# Patient Record
Sex: MALE | Race: WHITE | NOT HISPANIC OR LATINO | Employment: OTHER | URBAN - METROPOLITAN AREA
[De-identification: names, ages, dates, MRNs, and addresses within clinical notes are randomized per-mention and may not be internally consistent; named-entity substitution may affect disease eponyms.]

---

## 2019-11-21 ENCOUNTER — TRANSCRIBE ORDERS (OUTPATIENT)
Dept: ADMINISTRATIVE | Facility: HOSPITAL | Age: 64
End: 2019-11-21

## 2019-11-21 ENCOUNTER — APPOINTMENT (OUTPATIENT)
Dept: RADIOLOGY | Facility: CLINIC | Age: 64
End: 2019-11-21
Payer: COMMERCIAL

## 2019-11-21 DIAGNOSIS — M25.562 LEFT KNEE PAIN, UNSPECIFIED CHRONICITY: ICD-10-CM

## 2019-11-21 DIAGNOSIS — M25.562 LEFT KNEE PAIN, UNSPECIFIED CHRONICITY: Primary | ICD-10-CM

## 2019-11-21 PROCEDURE — 73564 X-RAY EXAM KNEE 4 OR MORE: CPT

## 2022-09-14 ENCOUNTER — TELEPHONE (OUTPATIENT)
Dept: PAIN MEDICINE | Facility: CLINIC | Age: 67
End: 2022-09-14

## 2022-09-14 NOTE — TELEPHONE ENCOUNTER
Pt is schedule for a consult with Dr Wayne Henry on 9/20/22 but [employer] PPO is coming back as E-jected     Does Pt have new insurance? Name? Member ID#?

## 2022-09-14 NOTE — TELEPHONE ENCOUNTER
pts wife called and provided insurance update :    Primary is Medicare a/b    Member [id number]       [employer]   Member ID- [id number]

## 2022-09-20 ENCOUNTER — APPOINTMENT (OUTPATIENT)
Dept: RADIOLOGY | Facility: CLINIC | Age: 67
End: 2022-09-20
Payer: MEDICARE

## 2022-09-20 ENCOUNTER — CONSULT (OUTPATIENT)
Dept: PAIN MEDICINE | Facility: CLINIC | Age: 67
End: 2022-09-20
Payer: MEDICARE

## 2022-09-20 VITALS
SYSTOLIC BLOOD PRESSURE: 118 MMHG | WEIGHT: 240 LBS | DIASTOLIC BLOOD PRESSURE: 74 MMHG | HEART RATE: 77 BPM | HEIGHT: 78 IN | TEMPERATURE: 98.1 F | BODY MASS INDEX: 27.77 KG/M2

## 2022-09-20 DIAGNOSIS — M54.9 MID BACK PAIN: ICD-10-CM

## 2022-09-20 DIAGNOSIS — R29.898 WEAKNESS OF BOTH LOWER EXTREMITIES: ICD-10-CM

## 2022-09-20 DIAGNOSIS — M35.3 POLYMYALGIA RHEUMATICA (HCC): ICD-10-CM

## 2022-09-20 DIAGNOSIS — M54.9 MID BACK PAIN: Primary | ICD-10-CM

## 2022-09-20 PROBLEM — G72.9 MYOPATHY: Status: ACTIVE | Noted: 2021-10-15

## 2022-09-20 PROCEDURE — 72100 X-RAY EXAM L-S SPINE 2/3 VWS: CPT

## 2022-09-20 PROCEDURE — 99204 OFFICE O/P NEW MOD 45 MIN: CPT | Performed by: ANESTHESIOLOGY

## 2022-09-20 PROCEDURE — 71100 X-RAY EXAM RIBS UNI 2 VIEWS: CPT

## 2022-09-20 PROCEDURE — 72070 X-RAY EXAM THORAC SPINE 2VWS: CPT

## 2022-09-20 RX ORDER — GLIMEPIRIDE 2 MG/1
TABLET ORAL
COMMUNITY
Start: 2022-07-11

## 2022-09-20 RX ORDER — METOPROLOL SUCCINATE 50 MG/1
50 TABLET, EXTENDED RELEASE ORAL DAILY
COMMUNITY
Start: 2022-09-01

## 2022-09-20 RX ORDER — LEVOTHYROXINE SODIUM 125 UG/1
TABLET ORAL
COMMUNITY
Start: 2022-08-08

## 2022-09-20 RX ORDER — AMLODIPINE BESYLATE 10 MG/1
10 TABLET ORAL DAILY
COMMUNITY
Start: 2022-08-08

## 2022-09-20 RX ORDER — PREDNISONE 1 MG/1
TABLET ORAL DAILY
COMMUNITY

## 2022-09-20 RX ORDER — SIMVASTATIN 40 MG
40 TABLET ORAL EVERY EVENING
COMMUNITY
Start: 2022-08-02

## 2022-09-20 NOTE — PROGRESS NOTES
Assessment  1  Mid back pain    2  Weakness of both lower extremities    3  Polymyalgia rheumatica ([address] )        Plan    Pleasant 44-year-old gentleman two month history of right-sided back spasms however patient has a history of weakness lower limbs recent MRI from 2021 revealed no significant stenosis EMG also apparently reveals weakness  At this point in time as symptoms persist I believe it is reasonable obtain x-rays of the thoracolumbar spine trial any compression fracture as well as rib x-rays to make sure there is no strain  Secondary to the weakness of lower limbs which is essentially unexplained as well as his current symptoms will obtain MRI of the thoracic spine to rule any significant pathology that may be contributing to his symptoms  May consider future gabapentin versus injectional therapy but will re-evaluate  My impressions and treatment recommendations were discussed in detail with the patient who verbalized understanding and had no further questions  Discharge instructions were provided  I personally saw and examined the patient and I agree with the above discussed plan of care  This note is created using dictation transcription  It may contain typographical errors, grammatical errors, improperly dictated words, background noise and other errors  Orders Placed This Encounter   Procedures    XR ribs 2 vw right     Standing Status:   Future     Standing Expiration Date:   9/20/2026     Scheduling Instructions:      Bring along any outside films relating to this procedure   X-ray thoracic spine 2 views     Standing Status:   Future     Standing Expiration Date:   9/20/2026     Scheduling Instructions:      Bring along any outside films relating to this procedure   X-ray lumbar spine 2 or 3 views     Standing Status:   Future     Standing Expiration Date:   9/20/2026     Scheduling Instructions:      Bring along any outside films relating to this procedure   MRI thoracic spine without contrast     Standing Status:   Future     Standing Expiration Date:   9/20/2026     Scheduling Instructions: There is no preparation for this test  Please leave your jewelry and valuables at home, wedding rings are the exception  All patients will be required to change into a hospital gown and pants  Street clothes are not permitted in the MRI  Magnetic nail polish must be removed prior to arrival for your test  Please bring your insurance cards, a form of photo ID and a list of your medications with you  Arrive 15 minutes prior to your appointment time in order to register  Please bring any prior CT or MRI studies of this area that were not performed at a [facility]  To schedule this appointment, please contact Central Scheduling at [phone]  Prior to your appointment, please make sure you complete the MRI Screening Form when you e-Check in for your appointment  This will be available starting 7 days before your appointment in [address]  You may receive an e-mail with an activation code if you do not have a [employer] account  If you do not have access to a device, we will complete your screening at your appointment  Order Specific Question:   What is the patient's sedation requirement? Answer:   No Sedation     Order Specific Question:   Release to patient through [employer]     Answer:   Immediate     Order Specific Question:   Is order priority selected as STAT?      Answer:   No     Order Specific Question:   Reason for Exam (FREE TEXT)     Answer:   lower extremity weakness     New Medications Ordered This Visit   Medications    metFORMIN (GLUCOPHAGE) 1000 MG tablet     Sig: TAKE AS DIRECTED 1 TABLET BY MOUTH TWICE DAILY FOR 90 DAYS    metoprolol succinate (TOPROL-XL) 50 mg 24 hr tablet     Sig: Take 50 mg by mouth daily    amLODIPine (NORVASC) 10 mg tablet     Sig: Take 10 mg by mouth daily    simvastatin (ZOCOR) 40 mg tablet Sig: Take 40 mg by mouth every evening    glimepiride (AMARYL) 2 mg tablet     Sig: TAKE 1/2 (ONE-HALF) TABLET BY MOUTH ONCE DAILY WITH BREAKFAST OR WITH FIRST MAIN MEAL OF THE DAY FOR ONE WEEK, THEN INCREASE TO 1 TABLET THEREAFTER WITH BREAKFAST ONCE DAILY    Euthyrox 125 MCG tablet     Sig: TAKE 1 TABLET BY MOUTH ONCE DAILY IN THE MORNING ON AN EMPTY STOMACH    predniSONE 5 mg tablet     Sig: Take by mouth daily     Referred By: Self  History of Present Illness    Sue Burgess Ny is a 74 y o  male with two month history of right-sided mid back pain  He is unaware of any clear precipitating event denies any trauma or injury  Also has a history of lower extremity weakness diagnosed with polymyalgia rheumatica and recently on oral steroids which is not helped with the right-sided back pain  Ultrasound reveals lipoma however he is concerning is his pain is almost significant interfering with daily living activities  It is moderate rates as 6/10 on the visual analog scale constant worse in the morning describes sharp  Patient also history of diabetes mellitus  Exercise and coughing aggravates his symptoms  He is a history of prior lumbar spine surgery  Nerve blocks physical therapy exercise have provided no relief if his current symptoms  I have personally reviewed and/or updated the patient's past medical history, past surgical history, family history, social history, current medications, allergies, and vital signs today  Review of Systems   Constitutional: Negative for fever and unexpected weight change  HENT: Negative for trouble swallowing  Eyes: Negative for visual disturbance  Respiratory: Negative for shortness of breath and wheezing  Cardiovascular: Negative for chest pain and palpitations  Gastrointestinal: Negative for constipation, diarrhea, nausea and vomiting  Endocrine: Negative for cold intolerance, heat intolerance and polydipsia     Genitourinary: Negative for difficulty urinating and frequency  Musculoskeletal: Negative for arthralgias, gait problem, joint swelling and myalgias  Skin: Negative for rash  Neurological: Negative for dizziness, seizures, syncope, weakness and headaches  Hematological: Does not bruise/bleed easily  Psychiatric/Behavioral: Negative for dysphoric mood  All other systems reviewed and are negative  Patient Active Problem List   Diagnosis    Polymyalgia rheumatica ([facility] )    Myopathy       Past Medical History:   Diagnosis Date    Diabetes mellitus ([facility] )     Thyroid disease        Past Surgical History:   Procedure Laterality Date    BACK SURGERY      HERNIA REPAIR      KNEE SURGERY Left     SHOULDER SURGERY Bilateral        History reviewed  No pertinent family history  Social History     Occupational History    Not on file   Tobacco Use    Smoking status: Never Smoker    Smokeless tobacco: Never Used   Vaping Use    Vaping Use: Every day    Substances: THC, CBD   Substance and Sexual Activity    Alcohol use: Yes    Drug use: Yes     Types: Marijuana    Sexual activity: Not Currently       Current Outpatient Medications on File Prior to Visit   Medication Sig    amLODIPine (NORVASC) 10 mg tablet Take 10 mg by mouth daily    Euthyrox 125 MCG tablet TAKE 1 TABLET BY MOUTH ONCE DAILY IN THE MORNING ON AN EMPTY STOMACH    glimepiride (AMARYL) 2 mg tablet TAKE 1/2 (ONE-HALF) TABLET BY MOUTH ONCE DAILY WITH BREAKFAST OR WITH FIRST MAIN MEAL OF THE DAY FOR ONE WEEK, THEN INCREASE TO 1 TABLET THEREAFTER WITH BREAKFAST ONCE DAILY    metFORMIN (GLUCOPHAGE) 1000 MG tablet TAKE AS DIRECTED 1 TABLET BY MOUTH TWICE DAILY FOR 90 DAYS    metoprolol succinate (TOPROL-XL) 50 mg 24 hr tablet Take 50 mg by mouth daily    predniSONE 5 mg tablet Take by mouth daily    simvastatin (ZOCOR) 40 mg tablet Take 40 mg by mouth every evening     No current facility-administered medications on file prior to visit         No Known Allergies    Physical Exam    /74 (BP Location: Left arm, Patient Position: Sitting, Cuff Size: Standard)   Pulse 77   Temp 98 1 °F (36 7 °C)   Ht 6' 6" (1 981 m)   Wt 109 kg (240 lb)   BMI 27 73 kg/m²     Constitutional: normal, well developed, well nourished, alert, in no distress and non-toxic and no overt pain behavior  Eyes: anicteric  HEENT: grossly intact  Neck: supple, symmetric, trachea midline and no masses   Pulmonary:even and unlabored  Cardiovascular:No edema or pitting edema present  Skin:Normal without rashes or lesions and well hydrated  Psychiatric:Mood and affect appropriate  Neurologic:Cranial Nerves II-XII grossly intact  Musculoskeletal:normal, no focal deficit appreciated lower limbs deep tendon reflexes are symmetrical 2+ bilateral upper lower limbs, no sensory deficits appreciated, negative bilateral straight leg raising, there is on the right rib region palpable mass  No tenderness on the thoracolumbar spinous process    Imaging    US Extremity nonvascular ltd @ 8-31-22  IMPRESSION:  1  Nonspecific solid isoechoic 6 1 cm subcutaneous mass corresponding with lump, likely lipoma  Clinical follow-up recommended  Further imaging can be obtained if indicated  MRI Lumbar Spine @ [facility] 8-24-21  IMPRESSION:   1  No focal disc herniation  2  No canal stenosis  No significant foraminal stenosis  3  Postop findings L5-S1  I have personally reviewed pertinent films in PACS and my interpretation is Lumbar spondylosis

## 2022-10-20 ENCOUNTER — OFFICE VISIT (OUTPATIENT)
Dept: PAIN MEDICINE | Facility: CLINIC | Age: 67
End: 2022-10-20
Payer: MEDICARE

## 2022-10-20 VITALS
WEIGHT: 231 LBS | BODY MASS INDEX: 26.73 KG/M2 | DIASTOLIC BLOOD PRESSURE: 70 MMHG | TEMPERATURE: 98.2 F | SYSTOLIC BLOOD PRESSURE: 110 MMHG | HEIGHT: 78 IN | HEART RATE: 73 BPM

## 2022-10-20 DIAGNOSIS — M54.9 MID BACK PAIN: ICD-10-CM

## 2022-10-20 DIAGNOSIS — G72.9 MYOPATHY: ICD-10-CM

## 2022-10-20 DIAGNOSIS — M47.814 THORACIC SPONDYLOSIS: ICD-10-CM

## 2022-10-20 DIAGNOSIS — M35.3 POLYMYALGIA RHEUMATICA (HCC): ICD-10-CM

## 2022-10-20 DIAGNOSIS — M51.24 THORACIC DISC HERNIATION: ICD-10-CM

## 2022-10-20 DIAGNOSIS — G89.4 CHRONIC PAIN SYNDROME: Primary | ICD-10-CM

## 2022-10-20 PROCEDURE — 99214 OFFICE O/P EST MOD 30 MIN: CPT | Performed by: NURSE PRACTITIONER

## 2022-10-20 RX ORDER — MELATONIN
1000 DAILY
COMMUNITY

## 2022-10-20 NOTE — PROGRESS NOTES
Assessment:  1  Chronic pain syndrome    2  Mid back pain    3  Thoracic spondylosis    4  Thoracic disc herniation    5  Polymyalgia rheumatica ([address] )    6  Myopathy        Plan:  The patient is a 74 y o  male last seen on 9-20-22 who presents for a follow up office visit in regards to secondary to midback pain, thoracic disc herniation, and polymyalgia rheumatica  Patient presents today with right-sided mid back pain  The pain has improved since the last office visit, and he feels it is now stable  MRI of the thoracic spine was reviewed with the patient which shows spondylosis as well as a right paracentral disc herniation at T8-T9  Since the pain is tolerable, I would not recommend moving forward with an epidural steroid injection  However if his pain symptoms would worsen, I instructed him to contact the office, an injection can be performed at that time        The patient will follow-up as needed for reevaluation  The patient was advised to contact the office should their symptoms worsen in the interim  The patient was agreeable and verbalized an understanding  History of Present Illness: The patient is a 74 y o  male last seen on 9-20-22 who presents for a follow up office visit in regards to secondary to midback pain, thoracic disc herniation, and polymyalgia rheumatica  His last office visit was September 20, 2022 which was his initial consultation, where he was ordered an MRI of the thoracic spine  Patient presents today with ongoing mid back pain  The pain is located primarily on the right side of the midback  It is intermittent and described as sharp  He feels the pain has improved since the last office visit, and the pain is tolerable right now  He did however, have a recent flare up while on vacation, where he was bending to walk into a lighthouse  The increased pain has subsided  He is rating his pain a 3/10 on numeric rating scale        The patient had underwent an MRI of the thoracic spine since the last office visit  It showed mild-to-moderate spondylosis at T8-T9 along with a moderate right-sided disc herniation at this level  He continues to be on prednisone but it was recently increased to 5 mg daily  This is prescribed by rheumatology    He had underwent EMG bilateral lower extremity in 2021 which showed possibility of myositis and a chronic left peroneal sensory motor neuropathy  I have personally reviewed and/or updated the patient's past medical history, past surgical history, family history, social history, current medications, allergies, and vital signs today  Review of Systems:    Review of Systems   Respiratory: Negative for shortness of breath  Cardiovascular: Negative for chest pain  Gastrointestinal: Negative for constipation, diarrhea, nausea and vomiting  Musculoskeletal: Negative for arthralgias, gait problem, joint swelling and myalgias  Skin: Negative for rash  Neurological: Negative for dizziness, seizures and weakness (muscle)  All other systems reviewed and are negative  Past Medical History:   Diagnosis Date   • Diabetes mellitus ([facility] )    • Thyroid disease        Past Surgical History:   Procedure Laterality Date   • BACK SURGERY     • HERNIA REPAIR     • KNEE SURGERY Left    • SHOULDER SURGERY Bilateral        No family history on file  Social History     Occupational History   • Not on file   Tobacco Use   • Smoking status: Never Smoker   • Smokeless tobacco: Never Used   Vaping Use   • Vaping Use: Every day   • Substances: THC, CBD   Substance and Sexual Activity   • Alcohol use:  Yes   • Drug use: Yes     Types: Marijuana   • Sexual activity: Not Currently         Current Outpatient Medications:   •  amLODIPine (NORVASC) 10 mg tablet, Take 10 mg by mouth daily, Disp: , Rfl:   •  Euthyrox 125 MCG tablet, TAKE 1 TABLET BY MOUTH ONCE DAILY IN THE MORNING ON AN EMPTY STOMACH, Disp: , Rfl:   •  glimepiride (AMARYL) 2 mg tablet, TAKE 1/2 (ONE-HALF) TABLET BY MOUTH ONCE DAILY WITH BREAKFAST OR WITH FIRST MAIN MEAL OF THE DAY FOR ONE WEEK, THEN INCREASE TO 1 TABLET THEREAFTER WITH BREAKFAST ONCE DAILY, Disp: , Rfl:   •  metFORMIN (GLUCOPHAGE) 1000 MG tablet, TAKE AS DIRECTED 1 TABLET BY MOUTH TWICE DAILY FOR 90 DAYS, Disp: , Rfl:   •  metoprolol succinate (TOPROL-XL) 50 mg 24 hr tablet, Take 50 mg by mouth daily, Disp: , Rfl:   •  predniSONE 5 mg tablet, Take by mouth daily, Disp: , Rfl:   •  simvastatin (ZOCOR) 40 mg tablet, Take 40 mg by mouth every evening, Disp: , Rfl:     No Known Allergies    Physical Exam:    There were no vitals taken for this visit  Constitutional:normal, well developed, well nourished, alert, in no distress and non-toxic and no overt pain behavior  Eyes:anicteric  HEENT:grossly intact  Neck:supple, symmetric, trachea midline and no masses   Pulmonary:even and unlabored  Cardiovascular:No edema or pitting edema present  Skin:Normal without rashes or lesions and well hydrated  Psychiatric:Mood and affect appropriate  Neurologic:Cranial Nerves II-XII grossly intact  Musculoskeletal:normal     Tenderness thoracic right paraspinal musculature       Imaging    MRI thoracic spine-10/10/2022 [facility]    No fracture or malalignment  Vertebral body heights are maintained  No pathological marrow signal       Mild-to-moderate anterior endplate spondylosis at mid to lower thoracic levels  There is disc narrowing at T8-T9  Small-to-moderate right paracentral herniation at T8-T9  No canal or foraminal stenosis  Thoracic cord is unremarkable and normal contour and caliber and signal   No cord compression  Impression:    Mild degenerative disc findings  Small-to-moderate right paracentral herniation at T8-T9 without signal mass effect  No canal or foraminal stenosis  No orders to display         No orders of the defined types were placed in this encounter

## 2022-10-21 ENCOUNTER — TELEPHONE (OUTPATIENT)
Dept: PAIN MEDICINE | Facility: CLINIC | Age: 67
End: 2022-10-21

## 2022-10-21 NOTE — TELEPHONE ENCOUNTER
Caller: Enoc     Doctor: Jeremías Negrete     Reason for call: patient is interested in moving forward and would like to schedule procedure       Call back#: [phone]

## 2022-10-24 DIAGNOSIS — M54.14 THORACIC RADICULOPATHY: ICD-10-CM

## 2022-10-24 DIAGNOSIS — M51.24 THORACIC DISC HERNIATION: Primary | ICD-10-CM

## 2022-10-28 ENCOUNTER — HOSPITAL ENCOUNTER (OUTPATIENT)
Dept: RADIOLOGY | Facility: CLINIC | Age: 67
Discharge: HOME | End: 2022-10-28
Payer: MEDICARE

## 2022-10-28 VITALS
SYSTOLIC BLOOD PRESSURE: 131 MMHG | DIASTOLIC BLOOD PRESSURE: 79 MMHG | RESPIRATION RATE: 20 BRPM | TEMPERATURE: 97.9 F | HEART RATE: 72 BPM | OXYGEN SATURATION: 96 %

## 2022-10-28 DIAGNOSIS — M54.14 THORACIC RADICULOPATHY: ICD-10-CM

## 2022-10-28 DIAGNOSIS — M51.24 THORACIC DISC HERNIATION: ICD-10-CM

## 2022-10-28 PROCEDURE — 62321 NJX INTERLAMINAR CRV/THRC: CPT | Performed by: ANESTHESIOLOGY

## 2022-10-28 RX ORDER — PAPAVERINE HCL 150 MG
10 CAPSULE, EXTENDED RELEASE ORAL ONCE
Status: COMPLETED | OUTPATIENT
Start: 2022-10-28 | End: 2022-10-28

## 2022-10-28 RX ADMIN — DEXAMETHASONE SODIUM PHOSPHATE 10 MG: 10 INJECTION, SOLUTION INTRAMUSCULAR; INTRAVENOUS at 10:48

## 2022-10-28 RX ADMIN — IOHEXOL 1 ML: 300 INJECTION, SOLUTION INTRAVENOUS at 10:48

## 2022-10-28 NOTE — H&P
History of Present Illness: The patient is a 74 y o  male who presents with complaints of thoracic pain  Past Medical History:   Diagnosis Date   • Diabetes mellitus ([facility] )    • Thyroid disease        Past Surgical History:   Procedure Laterality Date   • BACK SURGERY     • HERNIA REPAIR     • KNEE SURGERY Left    • SHOULDER SURGERY Bilateral          Current Outpatient Medications:   •  amLODIPine (NORVASC) 10 mg tablet, Take 10 mg by mouth daily, Disp: , Rfl:   •  cholecalciferol (VITAMIN D3) 1,000 units tablet, Take 1,000 Units by mouth daily, Disp: , Rfl:   •  Euthyrox 125 MCG tablet, TAKE 1 TABLET BY MOUTH ONCE DAILY IN THE MORNING ON AN EMPTY STOMACH, Disp: , Rfl:   •  glimepiride (AMARYL) 2 mg tablet, TAKE 1/2 (ONE-HALF) TABLET BY MOUTH ONCE DAILY WITH BREAKFAST OR WITH FIRST MAIN MEAL OF THE DAY FOR ONE WEEK, THEN INCREASE TO 1 TABLET THEREAFTER WITH BREAKFAST ONCE DAILY, Disp: , Rfl:   •  metFORMIN (GLUCOPHAGE) 1000 MG tablet, TAKE AS DIRECTED 1 TABLET BY MOUTH TWICE DAILY FOR 90 DAYS, Disp: , Rfl:   •  metoprolol succinate (TOPROL-XL) 50 mg 24 hr tablet, Take 50 mg by mouth daily, Disp: , Rfl:   •  predniSONE 5 mg tablet, Take by mouth daily, Disp: , Rfl:   •  simvastatin (ZOCOR) 40 mg tablet, Take 40 mg by mouth every evening, Disp: , Rfl:     Current Facility-Administered Medications:   •  dexamethasone (PF) (DECADRON) injection 10 mg, 10 mg, Epidural, Once, Ko Combs, DO  •  iohexol (OMNIPAQUE) 300 mg/mL injection 50 mL, 50 mL, Epidural, Once, Ko Combs, DO    No Known Allergies    Physical Exam:   General: Awake, Alert, Oriented x 3, Mood and affect appropriate  Respiratory: Respirations even and unlabored  Cardiovascular: Peripheral pulses intact; no edema  Musculoskeletal Exam:  Normal gait    ASA Score: II         Assessment:   1  Thoracic disc herniation    2   Thoracic radiculopathy        Plan: TESI @T9-direct towards right side

## 2022-10-28 NOTE — DISCHARGE INSTRUCTIONS
Epidural Steroid Injection   WHAT YOU NEED TO KNOW:   An epidural steroid injection (ENID) is a procedure to inject steroid medicine into the epidural space  The epidural space is between your spinal cord and vertebrae  Steroids reduce inflammation and fluid buildup in your spine that may be causing pain  You may be given pain medicine along with the steroids  ACTIVITY  Do not drive or operate machinery today  No strenuous activity today - bending, lifting, etc   You may resume normal activites starting tomorrow - start slowly and as tolerated  You may shower today, but no tub baths or hot tubs  You may have numbness for several hours from the local anesthetic  Please use caution and common sense, especially with weight-bearing activities  CARE OF THE INJECTION SITE  If you have soreness or pain, apply ice to the area today (20 minutes on/20 minutes off)  Starting tomorrow, you may use warm, moist heat or ice if needed  You may have an increase or change in your discomfort for 36-48 hours after your treatment  Apply ice and continue with any pain medication you have been prescribed  Notify the [facility] if you have any of the following: redness, drainage, swelling, headache, stiff neck or fever above 100°F     SPECIAL INSTRUCTIONS  Our office will contact you in approximately 7 days for a progress report  MEDICATIONS  Continue to take all routine medications  Our office may have instructed you to hold some medications  As no general anesthesia was used in today's procedure, you should not experience any side effects related to anesthesia  If you are diabetic, the steroids used in today's injection may temporarily increase your blood sugar levels after the first few days after your injection  Please keep a close eye on your sugars and alert the doctor who manages your diabetes if your sugars are significantly high from your baseline or you are symptomatic       If you have a problem specifically related to your procedure, please call our office at [phone]  Problems not related to your procedure should be directed to your primary care physician

## 2022-11-04 ENCOUNTER — TELEPHONE (OUTPATIENT)
Dept: PAIN MEDICINE | Facility: CLINIC | Age: 67
End: 2022-11-04

## 2022-11-04 NOTE — TELEPHONE ENCOUNTER
1st attempt  Lm to cb with % improvement and pain level.     TESI @T9-direct towards right side 10/28, 11/21 F/u

## 2022-11-08 NOTE — TELEPHONE ENCOUNTER
Pt reports 25-30% improvement post inj   Pain level 2-3/10  Pt aware I will call next week for an update

## 2022-11-21 ENCOUNTER — OFFICE VISIT (OUTPATIENT)
Dept: PAIN MEDICINE | Facility: CLINIC | Age: 67
End: 2022-11-21

## 2022-11-21 VITALS
HEIGHT: 78 IN | WEIGHT: 230 LBS | DIASTOLIC BLOOD PRESSURE: 70 MMHG | BODY MASS INDEX: 26.61 KG/M2 | TEMPERATURE: 97.5 F | SYSTOLIC BLOOD PRESSURE: 120 MMHG

## 2022-11-21 DIAGNOSIS — M54.9 MID BACK PAIN: ICD-10-CM

## 2022-11-21 DIAGNOSIS — M54.14 THORACIC RADICULOPATHY: ICD-10-CM

## 2022-11-21 DIAGNOSIS — M51.24 THORACIC DISC HERNIATION: ICD-10-CM

## 2022-11-21 DIAGNOSIS — M79.18 MYOFASCIAL PAIN SYNDROME: Primary | ICD-10-CM

## 2022-11-21 DIAGNOSIS — G89.4 CHRONIC PAIN SYNDROME: ICD-10-CM

## 2022-11-21 RX ORDER — ATORVASTATIN CALCIUM 40 MG/1
40 TABLET, FILM COATED ORAL DAILY
COMMUNITY
Start: 2022-08-29

## 2022-11-21 NOTE — PROGRESS NOTES
Assessment:  1  Chronic pain syndrome    2  Mid back pain    3  Thoracic disc herniation    4  Thoracic radiculopathy        Plan:  The patient is a 74 y o  male last seen on 10/28/2022 who presents for a follow up office visit in regards to chronic pain secondary to mid back pain, thoracic disc herniation and polymyalgia rheumatica  Patient presents today with ongoing right-sided mid back pain  MRI of the thoracic spine showed a moderate size  Right paracentral disc herniation at T8-T9  He would underwent a thoracic translaminar epidural steroid injection at T9, but he received 30% pain relief and continues to be symptomatic  Upon examination he does have muscle spasms noted in the right paraspinal musculature of the thoracic spine  I will schedule him for trigger point injections to help with myofascial release  We did discuss medications such as gabapentin/Lyrica, but the patient would prefer to avoid medications if possible at this time  The patient will follow-up for trigger point injections  The patient was advised to contact the office should their symptoms worsen in the interim  The patient was agreeable and verbalized an understanding  History of Present Illness: The patient is a 74 y o  male last seen on 10/28/2022 who presents for a follow up office visit in regards to chronic pain secondary to mid back pain, thoracic disc herniation and polymyalgia rheumatica  His last office visit was October 28, 2022 in which he had a thoracic translaminar epidural steroid injection at T9 towards the right  Patient presents today with ongoing right-sided low back pain  He feels the pain remains unchanged since the last office visit  The thoracic translaminar epidural steroid injection had provided 30% pain relief  His pain is constant and described as nagging  It worsens with lifting   He is rating his pain a 3/10 on the numeric rating scale    He is taking prednisone 5 mg for the polymyalgia rheumatica, which is prescribed by rheumatology  No other prescription pain medication    I have personally reviewed and/or updated the patient's past medical history, past surgical history, family history, social history, current medications, allergies, and vital signs today  Review of Systems:    Review of Systems      Past Medical History:   Diagnosis Date   • Diabetes mellitus ([facility] [address] )    • Thyroid disease        Past Surgical History:   Procedure Laterality Date   • BACK SURGERY     • HERNIA REPAIR     • KNEE SURGERY Left    • SHOULDER SURGERY Bilateral        No family history on file  Social History     Occupational History   • Not on file   Tobacco Use   • Smoking status: Never   • Smokeless tobacco: Never   Vaping Use   • Vaping Use: Every day   • Substances: THC, CBD   Substance and Sexual Activity   • Alcohol use:  Yes   • Drug use: Yes     Types: Marijuana   • Sexual activity: Not Currently         Current Outpatient Medications:   •  amLODIPine (NORVASC) 10 mg tablet, Take 10 mg by mouth daily, Disp: , Rfl:   •  cholecalciferol (VITAMIN D3) 1,000 units tablet, Take 1,000 Units by mouth daily, Disp: , Rfl:   •  Euthyrox 125 MCG tablet, TAKE 1 TABLET BY MOUTH ONCE DAILY IN THE MORNING ON AN EMPTY STOMACH, Disp: , Rfl:   •  glimepiride (AMARYL) 2 mg tablet, TAKE 1/2 (ONE-HALF) TABLET BY MOUTH ONCE DAILY WITH BREAKFAST OR WITH FIRST MAIN MEAL OF THE DAY FOR ONE WEEK, THEN INCREASE TO 1 TABLET THEREAFTER WITH BREAKFAST ONCE DAILY, Disp: , Rfl:   •  metFORMIN (GLUCOPHAGE) 1000 MG tablet, TAKE AS DIRECTED 1 TABLET BY MOUTH TWICE DAILY FOR 90 DAYS, Disp: , Rfl:   •  metoprolol succinate (TOPROL-XL) 50 mg 24 hr tablet, Take 50 mg by mouth daily, Disp: , Rfl:   •  predniSONE 5 mg tablet, Take by mouth daily, Disp: , Rfl:   •  simvastatin (ZOCOR) 40 mg tablet, Take 40 mg by mouth every evening, Disp: , Rfl:     No Known Allergies    Physical Exam:    There were no vitals taken for this visit  Constitutional:normal, well developed, well nourished, alert, in no distress and non-toxic and no overt pain behavior  Eyes:anicteric  HEENT:grossly intact  Neck:supple, symmetric, trachea midline and no masses   Pulmonary:even and unlabored  Cardiovascular:No edema or pitting edema present  Skin:Normal without rashes or lesions and well hydrated  Psychiatric:Mood and affect appropriate  Neurologic:Cranial Nerves II-XII grossly intact  Musculoskeletal:normal     Thoracic Spine Exam    Appearance:  Normal lordosis  Palpation/Tenderness:  right thoracic paraspinal tenderness  right thoracic spasm  trigger points palpable        Imaging    MRI thoracic spine-10/10/2022 [facility]     No fracture or malalignment  Vertebral body heights are maintained  No pathological marrow signal        Mild-to-moderate anterior endplate spondylosis at mid to lower thoracic levels  There is disc narrowing at T8-T9        Small-to-moderate right paracentral herniation at T8-T9        No canal or foraminal stenosis        Thoracic cord is unremarkable and normal contour and caliber and signal   No cord compression        Impression:     Mild degenerative disc findings        Small-to-moderate right paracentral herniation at T8-T9 without signal mass effect        No canal or foraminal stenosis  No orders to display         No orders of the defined types were placed in this encounter

## 2022-12-15 ENCOUNTER — PROCEDURE VISIT (OUTPATIENT)
Dept: PAIN MEDICINE | Facility: CLINIC | Age: 67
End: 2022-12-15

## 2022-12-15 VITALS
TEMPERATURE: 97.4 F | SYSTOLIC BLOOD PRESSURE: 148 MMHG | BODY MASS INDEX: 26.27 KG/M2 | DIASTOLIC BLOOD PRESSURE: 88 MMHG | HEIGHT: 78 IN | HEART RATE: 78 BPM | WEIGHT: 227 LBS

## 2022-12-15 DIAGNOSIS — M79.18 MYOFASCIAL PAIN SYNDROME: ICD-10-CM

## 2022-12-15 DIAGNOSIS — M54.9 MID BACK PAIN: ICD-10-CM

## 2022-12-15 DIAGNOSIS — G89.4 CHRONIC PAIN SYNDROME: ICD-10-CM

## 2022-12-15 RX ORDER — LEVOTHYROXINE SODIUM 0.12 MG/1
125 TABLET ORAL
COMMUNITY

## 2022-12-15 NOTE — PROGRESS NOTES
Universal Protocol:  Consent: Verbal consent obtained  Written consent obtained  Risks and benefits: risks, benefits and alternatives were discussed  Consent given by: patient  Patient understanding: patient states understanding of the procedure being performed  Patient consent: the patient's understanding of the procedure matches consent given  Procedure consent: procedure consent matches procedure scheduled  Site marked: the operative site was marked  Patient identity confirmed: verbally with patient    Supporting Documentation  Indications: pain   Trigger Point Injections: single/multiple trigger point(s): 1-2 muscle groups    Injection site identified by: palpation    Procedure Details  Location(s):    Middle Back: R thoracic paraspinals   Needle size: 25 G  Additional procedure details: Lidocaine 1% 50 mg/5ml Lot #: [id number]: [address]-2 Exp: 6/2025        Trigger Point Injection     Pre-operative diagnosis: myofascial pain    Post-operative diagnosis: myofascial pain    After risks and benefits were explained including bleeding, infection, worsening of the pain, damage to the area being injected, weakness, allergic reaction to medications, vascular injection, and nerve damage, signed consent was obtained  All questions were answered  The area of the trigger point was identified and the skin prepped three times with alcohol and the alcohol allowed to dry  Next, a 25 gauge 1 5 inch needle was placed in the area of the trigger point  Once reproduction of the pain was elicited and negative aspiration confirmed, the trigger point was injected and the needle removed  The patient did tolerate the procedure well and there were not complications  Medication used: 1% lidocaine 50 mg/5 ml 1 ml used per site ( 3 sites) 2 ml discarded      Trigger points injected: 3      Trigger point(s) location(s):  right thoracic paraspinal musculature       Trigger point injections will be performed today   The injections were reviewed with the patient, and they were made aware of the risks  Consent form was signed  The pain may temporarily increase after the injections and the site may be ecchymotic  The patient was advised to use ice as needed for the pain  See separate procedure note  The patient was given a pain diary to rate the pain over the next 24 hours  They were instructed to return the diary to our office